# Patient Record
Sex: MALE | Race: WHITE | Employment: FULL TIME | URBAN - METROPOLITAN AREA
[De-identification: names, ages, dates, MRNs, and addresses within clinical notes are randomized per-mention and may not be internally consistent; named-entity substitution may affect disease eponyms.]

---

## 2020-08-13 ENCOUNTER — HOSPITAL ENCOUNTER (EMERGENCY)
Age: 30
Discharge: HOME | End: 2020-08-13
Attending: EMERGENCY MEDICINE
Payer: COMMERCIAL

## 2020-08-13 ENCOUNTER — APPOINTMENT (OUTPATIENT)
Dept: GENERAL RADIOLOGY | Age: 30
End: 2020-08-13
Payer: COMMERCIAL

## 2020-08-13 VITALS
WEIGHT: 198 LBS | SYSTOLIC BLOOD PRESSURE: 140 MMHG | OXYGEN SATURATION: 100 % | HEART RATE: 99 BPM | RESPIRATION RATE: 14 BRPM | TEMPERATURE: 98.1 F | DIASTOLIC BLOOD PRESSURE: 87 MMHG

## 2020-08-13 PROCEDURE — 73030 X-RAY EXAM OF SHOULDER: CPT

## 2020-08-13 PROCEDURE — 72040 X-RAY EXAM NECK SPINE 2-3 VW: CPT

## 2020-08-13 PROCEDURE — 6370000000 HC RX 637 (ALT 250 FOR IP): Performed by: EMERGENCY MEDICINE

## 2020-08-13 PROCEDURE — 99283 EMERGENCY DEPT VISIT LOW MDM: CPT

## 2020-08-13 RX ORDER — METHOCARBAMOL 500 MG/1
750 TABLET, FILM COATED ORAL ONCE
Status: COMPLETED | OUTPATIENT
Start: 2020-08-13 | End: 2020-08-13

## 2020-08-13 RX ORDER — OXYCODONE HYDROCHLORIDE AND ACETAMINOPHEN 5; 325 MG/1; MG/1
1 TABLET ORAL ONCE
Status: COMPLETED | OUTPATIENT
Start: 2020-08-13 | End: 2020-08-13

## 2020-08-13 RX ORDER — HYDROCODONE BITARTRATE AND ACETAMINOPHEN 5; 325 MG/1; MG/1
1 TABLET ORAL EVERY 6 HOURS PRN
Qty: 12 TABLET | Refills: 0 | Status: SHIPPED | OUTPATIENT
Start: 2020-08-13 | End: 2020-08-16

## 2020-08-13 RX ORDER — NAPROXEN 500 MG/1
500 TABLET ORAL 2 TIMES DAILY
Qty: 20 TABLET | Refills: 0 | Status: SHIPPED | OUTPATIENT
Start: 2020-08-13 | End: 2020-08-23

## 2020-08-13 RX ORDER — METHOCARBAMOL 750 MG/1
750 TABLET, FILM COATED ORAL 3 TIMES DAILY
Qty: 15 TABLET | Refills: 0 | Status: SHIPPED | OUTPATIENT
Start: 2020-08-13 | End: 2020-08-18

## 2020-08-13 RX ADMIN — METHOCARBAMOL 750 MG: 500 TABLET ORAL at 23:03

## 2020-08-13 RX ADMIN — OXYCODONE HYDROCHLORIDE AND ACETAMINOPHEN 1 TABLET: 5; 325 TABLET ORAL at 23:03

## 2020-08-13 ASSESSMENT — PAIN DESCRIPTION - ONSET
ONSET_2: SUDDEN
ONSET: ON-GOING

## 2020-08-13 ASSESSMENT — PAIN SCALES - GENERAL
PAINLEVEL_OUTOF10: 3
PAINLEVEL_OUTOF10: 7
PAINLEVEL_OUTOF10: 7

## 2020-08-13 ASSESSMENT — PAIN DESCRIPTION - INTENSITY
RATING_2: 3
RATING_2: 3

## 2020-08-13 ASSESSMENT — PAIN DESCRIPTION - PROGRESSION
CLINICAL_PROGRESSION: NOT CHANGED
CLINICAL_PROGRESSION_2: NOT CHANGED

## 2020-08-13 ASSESSMENT — PAIN DESCRIPTION - DURATION: DURATION_2: CONTINUOUS

## 2020-08-13 ASSESSMENT — PAIN DESCRIPTION - DESCRIPTORS
DESCRIPTORS_2: SORE
DESCRIPTORS: ACHING;SHARP;SHOOTING

## 2020-08-13 ASSESSMENT — PAIN DESCRIPTION - FREQUENCY: FREQUENCY: CONTINUOUS

## 2020-08-13 ASSESSMENT — PAIN DESCRIPTION - ORIENTATION
ORIENTATION: LEFT;POSTERIOR;ANTERIOR
ORIENTATION_2: POSTERIOR

## 2020-08-13 ASSESSMENT — PAIN DESCRIPTION - PAIN TYPE: TYPE: ACUTE PAIN

## 2020-08-13 ASSESSMENT — PAIN DESCRIPTION - LOCATION
LOCATION: SHOULDER
LOCATION_2: NECK

## 2020-08-14 NOTE — ED TRIAGE NOTES
C/O pain to left shoulder and neck. States he ran into the boards playing hockey. No obvious deformity noted to left shoulder. Tenderness on palpation. Decreased ROM secondary to pain. Sensation and circulation intact.

## 2020-08-14 NOTE — ED PROVIDER NOTES
[facility] EMERGENCY DEPT VISIT      Patient Identification  Chris Lema is a 31 y.o. male. Chief Complaint   Shoulder Injury (Left shoulder pain after running into the wall playing hockey) and Neck Injury      History of Present Illness: This is a  31 y.o. male who presents ambulatory  to the ED with complaints of left shoulder pain and neck soreness. Patient was playing hockey and went for the puck and lost control and ran into the wall hitting his left nondominant shoulder. He did not hit head. No loc. No headache. Neck is sore. No radicular pain, numbness or weakness. No rib pain or back pain. Left shoulder hurts to move. No left elbow or wrist pain. No past medical history on file. No past surgical history on file. No current facility-administered medications for this encounter. Current Outpatient Medications:     HYDROcodone-acetaminophen (NORCO) 5-325 MG per tablet, Take 1 tablet by mouth every 6 hours as needed for Pain for up to 3 days. Intended supply: 3 days.  Take lowest dose possible to manage pain, Disp: 12 tablet, Rfl: 0    methocarbamol (ROBAXIN-750) 750 MG tablet, Take 1 tablet by mouth 3 times daily for 5 days, Disp: 15 tablet, Rfl: 0    naproxen (NAPROSYN) 500 MG tablet, Take 1 tablet by mouth 2 times daily for 10 days, Disp: 20 tablet, Rfl: 0    Allergies   Allergen Reactions    Shrimp (Diagnostic)        Social History     Socioeconomic History    Marital status: Single     Spouse name: Not on file    Number of children: Not on file    Years of education: Not on file    Highest education level: Not on file   Occupational History    Not on file   Social Needs    Financial resource strain: Not on file    Food insecurity     Worry: Not on file     Inability: Not on file    Transportation needs     Medical: Not on file     Non-medical: Not on file   Tobacco Use    Smoking status: Current Some Day Smoker     Types: E-Cigarettes    Smokeless tobacco: Never Used Substance and Sexual Activity    Alcohol use: Yes     Comment: Socially    Drug use: Never    Sexual activity: Not on file   Lifestyle    Physical activity     Days per week: Not on file     Minutes per session: Not on file    Stress: Not on file   Relationships    Social connections     Talks on phone: Not on file     Gets together: Not on file     Attends Yazidi service: Not on file     Active member of club or organization: Not on file     Attends meetings of clubs or organizations: Not on file     Relationship status: Not on file    Intimate partner violence     Fear of current or ex partner: Not on file     Emotionally abused: Not on file     Physically abused: Not on file     Forced sexual activity: Not on file   Other Topics Concern    Not on file   Social History Narrative    Not on file       Nursing Notes Reviewed      ROS:  General: no fever  CARDIAC: no chest pain  RESP: no cough, no shortness of breath  GI: no abdominal pain, no vomiting  Musculoskeletal: + arthralgia, no myalgia, no back pain, + neck pain, + joint swelling  NEURO: no headache, no numbness, no weakness  DERM: no rash, no erythema, no ecchymosis, no wounds      PHYSICAL EXAM:  GENERAL APPEARANCE: Nick Hopikns is in no acute respiratory distress. Awake and alert. VITAL SIGNS:   ED Triage Vitals [08/13/20 2150]   Enc Vitals Group      BP (!) 140/87      Pulse 99      Resp 14      Temp 98.1 °F (36.7 °C)      Temp Source Oral      SpO2 100 %      Weight 198 lb (89.8 kg)      Height       Head Circumference       Peak Flow       Pain Score       Pain Loc       Pain Edu? Excl. in [address]? HEAD: Normocephalic, atraumatic. EYES:  Extraocular muscles are intact. Conjunctivas are pink. Negative scleral icterus. ENT:  Mucous membranes are moist.  Pharynx without erythema or exudates. NECK: mildly tender diffusely over midlline and paracervical muscles with good ROM. CHEST: Clear to auscultation bilaterally.  No rales, rhonchi, or wheezing. Chest wall is nontender  HEART:  Regular rate and rhythm. No murmurs. Strong and equal pulses in the upper and lower extremities. ABDOMEN: Soft,  nondistended, positive bowel sounds. abdomen is nontender. MUSCULOSKELETAL:  Active range of motion of the upper and lower extremities. No edema. No thoracic midline tenderness. Left shoulder tender anteriorly over distal clavicle. nontender over humeral head. Marked reduction of ROM due to pain. No scapular tenderness. Strong radial pulse  NEUROLOGICAL: Awake, alert and oriented x 3. Power intact in the upper and lower extremities. Sensation intact to arms  DERMATOLOGIC: No petechiae, rashes, or ecchymoses. ED COURSE AND MEDICAL DECISION MAKING:      Radiology:  All plain films have been evaluated by myself. They may have been overread by radiologist as noted in chart. Other radiologic studies (i.e. CT, MRI, ultrasounds, etc ) have been interpreted by radiologist.       Angella Gallegos Cervical Spine (2-3 Views)    Result Date: 8/13/2020  Cervical spine radiographs 3 views. Indication: Hit the wall playing hockey. Neck pain. Comparison study: None. Findings: No prevertebral soft tissue swelling. No anterolisthesis or posterior listhesis. Normal vertebral body heights. Normal dens. No evidence of acute fracture. Impression: 1. No evidence of acute fracture    Xr Shoulder Left (min 2 Views)    Addendum Date: 8/13/2020     ADDENDUM #1  Addendum: Impression should read \"acute comminuted distracted and attenuated fracture of the distal clavicle with superior subluxation at acromioclavicular joint. \"     Result Date: 8/13/2020  ORIGINAL REPORTLeft shoulder radiographs 4 views. Indication: Ran into wall playing hockey. Complains of left shoulder pain more posteriorly. Comparison study: None. Findings: Distal left clavicular fracture with mild distraction, mild comminution, and mild apex superior angulation.  Superior subluxation of the distal clavicular fragment relative to acromion. Impression: 1. Acute comminuted, distracted, and angulated fracture of the distal acromion with superior subluxation acromioclavicular joint. Treatment in the department:  Patient received   Medications   oxyCODONE-acetaminophen (PERCOCET) 5-325 MG per tablet 1 tablet (1 tablet Oral Given 8/13/20 2303)   methocarbamol (ROBAXIN) tablet 750 mg (750 mg Oral Given 8/13/20 2303)     Sling and swathe were placed    Medical decision making:  Patient presents emergency department with neck and left shoulder pain after running into the wall playing hockey. He has neurologically intact. No radicular symptoms. Diffuse neck soreness but with good range of motion. X-rays were negative for obvious fractures. His left shoulder x-ray did show fracture of the distal left clavicle which is distracted and likely . No dislocation of the shoulder. Shoulder was immobilized and he will be referred to orthopedics. I estimate there is LOW risk for , DISLOCATION, COMPARTMENT SYNDROME, DEEP VENOUS THROMBOSIS, TENDON OR NEUROVASCULAR INJURY, thus I consider the discharge disposition reasonable. Santiago Smith and I have discussed the diagnosis and risks, and we agree with discharging home to follow-up with their primary doctor or the referral orthopedist. We also discussed returning to the Emergency Department immediately if new or worsening symptoms occur. Clinical Impression:  1. Displaced fracture of lateral end of left clavicle, initial encounter for closed fracture    2. Cervical strain, acute, initial encounter        Dispo:  Patient will be discharged  at this time. Patient was informed of this decision and agrees with plan. I have discussed lab and xray findings with patient and they understand. Questions were answered to the best of my ability. Discharge vitals:  Blood pressure (!) 140/87, pulse 99, temperature 98.1 °F (36.7 °C), temperature source Oral, resp.  rate 14, weight 198 lb (89.8 kg), SpO2 100 %. Prescriptions given:   New Prescriptions    HYDROCODONE-ACETAMINOPHEN (NORCO) 5-325 MG PER TABLET    Take 1 tablet by mouth every 6 hours as needed for Pain for up to 3 days. Intended supply: 3 days. Take lowest dose possible to manage pain    METHOCARBAMOL (ROBAXIN-750) 750 MG TABLET    Take 1 tablet by mouth 3 times daily for 5 days    NAPROXEN (NAPROSYN) 500 MG TABLET    Take 1 tablet by mouth 2 times daily for 10 days         This chart was created using dragon voice recognition software.         Lydia Arce MD  08/13/20 9286